# Patient Record
Sex: FEMALE | Race: WHITE | NOT HISPANIC OR LATINO | Employment: OTHER | URBAN - METROPOLITAN AREA
[De-identification: names, ages, dates, MRNs, and addresses within clinical notes are randomized per-mention and may not be internally consistent; named-entity substitution may affect disease eponyms.]

---

## 2023-03-07 LAB
ALBUMIN (G/DL) IN SER/PLAS: 4 G/DL (ref 3.4–5)
ANION GAP IN SER/PLAS: 12 MMOL/L (ref 10–20)
CALCIUM (MG/DL) IN SER/PLAS: 9.8 MG/DL (ref 8.6–10.3)
CARBON DIOXIDE, TOTAL (MMOL/L) IN SER/PLAS: 33 MMOL/L (ref 21–32)
CHLORIDE (MMOL/L) IN SER/PLAS: 102 MMOL/L (ref 98–107)
CREATININE (MG/DL) IN SER/PLAS: 1.98 MG/DL (ref 0.5–1.05)
ERYTHROCYTE DISTRIBUTION WIDTH (RATIO) BY AUTOMATED COUNT: 14.8 % (ref 11.5–14.5)
ERYTHROCYTE MEAN CORPUSCULAR HEMOGLOBIN CONCENTRATION (G/DL) BY AUTOMATED: 32.9 G/DL (ref 32–36)
ERYTHROCYTE MEAN CORPUSCULAR VOLUME (FL) BY AUTOMATED COUNT: 102 FL (ref 80–100)
ERYTHROCYTES (10*6/UL) IN BLOOD BY AUTOMATED COUNT: 3.83 X10E12/L (ref 4–5.2)
GFR FEMALE: 24 ML/MIN/1.73M2
GLUCOSE (MG/DL) IN SER/PLAS: 122 MG/DL (ref 74–99)
HEMATOCRIT (%) IN BLOOD BY AUTOMATED COUNT: 38.9 % (ref 36–46)
HEMOGLOBIN (G/DL) IN BLOOD: 12.8 G/DL (ref 12–16)
LEUKOCYTES (10*3/UL) IN BLOOD BY AUTOMATED COUNT: 7.2 X10E9/L (ref 4.4–11.3)
PHOSPHATE (MG/DL) IN SER/PLAS: 3 MG/DL (ref 2.5–4.9)
PLATELETS (10*3/UL) IN BLOOD AUTOMATED COUNT: 356 X10E9/L (ref 150–450)
POTASSIUM (MMOL/L) IN SER/PLAS: 4.1 MMOL/L (ref 3.5–5.3)
SODIUM (MMOL/L) IN SER/PLAS: 143 MMOL/L (ref 136–145)
UREA NITROGEN (MG/DL) IN SER/PLAS: 29 MG/DL (ref 6–23)

## 2023-03-08 LAB — PARATHYRIN INTACT (PG/ML) IN SER/PLAS: 73.2 PG/ML (ref 18.5–88)

## 2023-03-14 PROBLEM — I48.0 PAROXYSMAL ATRIAL FIBRILLATION (MULTI): Status: ACTIVE | Noted: 2023-03-14

## 2023-03-14 PROBLEM — I50.9 CHF (CONGESTIVE HEART FAILURE) (MULTI): Status: ACTIVE | Noted: 2023-03-14

## 2023-03-14 PROBLEM — E46 PROTEIN-CALORIE MALNUTRITION (MULTI): Status: ACTIVE | Noted: 2023-03-14

## 2023-03-14 PROBLEM — N18.4 STAGE 4 CHRONIC KIDNEY DISEASE (MULTI): Status: ACTIVE | Noted: 2023-03-14

## 2023-03-14 PROBLEM — N18.9 CKD (CHRONIC KIDNEY DISEASE): Status: ACTIVE | Noted: 2023-03-14

## 2023-03-14 PROBLEM — M10.9 GOUT OF LEFT ANKLE: Status: ACTIVE | Noted: 2023-03-14

## 2023-05-30 ENCOUNTER — TELEPHONE (OUTPATIENT)
Dept: PRIMARY CARE | Facility: CLINIC | Age: 88
End: 2023-05-30
Payer: MEDICARE

## 2023-05-30 RX ORDER — ALLOPURINOL 100 MG/1
100 TABLET ORAL DAILY
COMMUNITY
End: 2023-06-26 | Stop reason: SDUPTHER

## 2023-05-30 RX ORDER — AMIODARONE HYDROCHLORIDE 100 MG/1
100 TABLET ORAL DAILY
COMMUNITY
End: 2023-09-25 | Stop reason: SDUPTHER

## 2023-05-30 RX ORDER — AMIODARONE HYDROCHLORIDE 200 MG/1
TABLET ORAL DAILY
COMMUNITY
Start: 2023-04-05 | End: 2023-09-26 | Stop reason: SDUPTHER

## 2023-05-30 RX ORDER — ALBUTEROL SULFATE 90 UG/1
AEROSOL, METERED RESPIRATORY (INHALATION)
COMMUNITY
Start: 2018-02-14

## 2023-05-30 RX ORDER — NEBIVOLOL 10 MG/1
10 TABLET ORAL 2 TIMES DAILY
COMMUNITY

## 2023-05-30 RX ORDER — FLUTICASONE PROPIONATE 50 MCG
1 SPRAY, SUSPENSION (ML) NASAL 2 TIMES DAILY
COMMUNITY
Start: 2019-04-08

## 2023-05-30 RX ORDER — PANTOPRAZOLE SODIUM 40 MG/1
TABLET, DELAYED RELEASE ORAL
COMMUNITY
End: 2023-06-26 | Stop reason: SDUPTHER

## 2023-05-30 RX ORDER — ROSUVASTATIN CALCIUM 10 MG/1
10 TABLET, COATED ORAL DAILY
COMMUNITY

## 2023-05-30 RX ORDER — FUROSEMIDE 40 MG/1
40 TABLET ORAL 2 TIMES DAILY
COMMUNITY

## 2023-05-30 RX ORDER — VIT C/E/ZN/COPPR/LUTEIN/ZEAXAN 250MG-90MG
CAPSULE ORAL
COMMUNITY

## 2023-05-30 RX ORDER — CLONIDINE HYDROCHLORIDE 0.1 MG/1
0.1 TABLET ORAL 3 TIMES DAILY
COMMUNITY

## 2023-05-30 RX ORDER — ISOSORBIDE MONONITRATE 60 MG/1
60 TABLET, EXTENDED RELEASE ORAL DAILY
COMMUNITY

## 2023-05-30 RX ORDER — CARBOXYMETHYLCELLULOSE SODIUM 10 MG/ML
GEL OPHTHALMIC
COMMUNITY

## 2023-05-30 RX ORDER — CALCITRIOL 0.25 UG/1
CAPSULE ORAL
COMMUNITY

## 2023-05-30 RX ORDER — ZOLPIDEM TARTRATE 5 MG/1
TABLET ORAL
COMMUNITY
Start: 2021-10-11 | End: 2024-02-28 | Stop reason: ALTCHOICE

## 2023-05-30 RX ORDER — EZETIMIBE 10 MG/1
10 TABLET ORAL DAILY
COMMUNITY

## 2023-05-30 RX ORDER — POTASSIUM CHLORIDE 750 MG/1
20 TABLET, EXTENDED RELEASE ORAL DAILY
COMMUNITY

## 2023-05-30 RX ORDER — LEVOTHYROXINE SODIUM 75 UG/1
75 TABLET ORAL DAILY
COMMUNITY
End: 2023-06-20 | Stop reason: SDUPTHER

## 2023-05-30 RX ORDER — APIXABAN 2.5 MG/1
2.5 TABLET, FILM COATED ORAL 2 TIMES DAILY
COMMUNITY

## 2023-05-30 RX ORDER — TERBINAFINE HYDROCHLORIDE 250 MG/1
250 TABLET ORAL DAILY
COMMUNITY

## 2023-05-30 RX ORDER — ACETAMINOPHEN 500 MG
TABLET ORAL
COMMUNITY

## 2023-05-30 RX ORDER — POTASSIUM CHLORIDE 20 MEQ/1
TABLET, EXTENDED RELEASE ORAL
COMMUNITY
Start: 2023-03-31

## 2023-05-30 RX ORDER — CETIRIZINE HYDROCHLORIDE 10 MG/1
TABLET ORAL
COMMUNITY

## 2023-05-30 RX ORDER — POLYETHYLENE GLYCOL 3350 17 G/17G
1 POWDER, FOR SOLUTION ORAL
COMMUNITY

## 2023-06-20 DIAGNOSIS — E03.9 HYPOTHYROIDISM, UNSPECIFIED TYPE: ICD-10-CM

## 2023-06-20 RX ORDER — LEVOTHYROXINE SODIUM 75 UG/1
75 TABLET ORAL DAILY
Qty: 90 TABLET | Refills: 1 | Status: SHIPPED | OUTPATIENT
Start: 2023-06-20 | End: 2024-01-08 | Stop reason: SDUPTHER

## 2023-06-26 DIAGNOSIS — M10.9 GOUT OF LEFT ANKLE, UNSPECIFIED CAUSE, UNSPECIFIED CHRONICITY: ICD-10-CM

## 2023-06-26 DIAGNOSIS — K21.00 GASTROESOPHAGEAL REFLUX DISEASE WITH ESOPHAGITIS WITHOUT HEMORRHAGE: ICD-10-CM

## 2023-07-03 RX ORDER — PANTOPRAZOLE SODIUM 40 MG/1
TABLET, DELAYED RELEASE ORAL
Qty: 90 TABLET | Refills: 1 | Status: SHIPPED | OUTPATIENT
Start: 2023-07-03 | End: 2023-09-28

## 2023-07-03 RX ORDER — ALLOPURINOL 100 MG/1
100 TABLET ORAL DAILY
Qty: 90 TABLET | Refills: 1 | Status: SHIPPED | OUTPATIENT
Start: 2023-07-03 | End: 2024-01-30 | Stop reason: SDUPTHER

## 2023-09-06 ENCOUNTER — HOSPITAL ENCOUNTER (OUTPATIENT)
Dept: DATA CONVERSION | Facility: HOSPITAL | Age: 88
Discharge: HOME | End: 2023-09-06
Payer: MEDICARE

## 2023-09-06 DIAGNOSIS — N18.4 CHRONIC KIDNEY DISEASE, STAGE 4 (SEVERE) (MULTI): ICD-10-CM

## 2023-09-06 LAB
ALBUMIN SERPL-MCNC: 4 GM/DL (ref 3.5–5)
ANION GAP SERPL CALCULATED.3IONS-SCNC: 14 MMOL/L (ref 0–19)
BUN SERPL-MCNC: 31 MG/DL (ref 8–25)
BUN/CREAT SERPL: 16.3 RATIO (ref 8–21)
CALCIUM SERPL-MCNC: 9.7 MG/DL (ref 8.5–10.4)
CHLORIDE SERPL-SCNC: 102 MMOL/L (ref 97–107)
CO2 SERPL-SCNC: 26 MMOL/L (ref 24–31)
CREAT SERPL-MCNC: 1.9 MG/DL (ref 0.4–1.6)
DEPRECATED RDW RBC AUTO: 52.5 FL (ref 37–54)
ERYTHROCYTE [DISTWIDTH] IN BLOOD BY AUTOMATED COUNT: 14.5 % (ref 11.7–15)
GFR SERPL CREATININE-BSD FRML MDRD: 25 ML/MIN/1.73 M2
GLUCOSE SERPL-MCNC: 119 MG/DL (ref 65–99)
HCT VFR BLD AUTO: 39.5 % (ref 36–44)
HGB BLD-MCNC: 13.3 GM/DL (ref 12–15)
MCH RBC QN AUTO: 33.5 PG (ref 26–34)
MCHC RBC AUTO-ENTMCNC: 33.7 % (ref 31–37)
MCV RBC AUTO: 99.5 FL (ref 80–100)
NRBC BLD-RTO: 0 /100 WBC
PHOSPHATE SERPL-MCNC: 3.4 MG/DL (ref 2.5–4.5)
PLATELET # BLD AUTO: 368 K/UL (ref 150–450)
PMV BLD AUTO: 10.9 CU (ref 7–12.6)
POTASSIUM SERPL-SCNC: 4 MMOL/L (ref 3.4–5.1)
PTH-INTACT SERPL-MCNC: 88 PG/ML (ref 15–65)
RBC # BLD AUTO: 3.97 M/UL (ref 4–4.9)
SODIUM SERPL-SCNC: 142 MMOL/L (ref 133–145)
WBC # BLD AUTO: 7.7 K/UL (ref 4.5–11)

## 2023-09-25 DIAGNOSIS — I48.0 PAROXYSMAL ATRIAL FIBRILLATION (MULTI): ICD-10-CM

## 2023-09-26 RX ORDER — AMIODARONE HYDROCHLORIDE 100 MG/1
100 TABLET ORAL DAILY
Qty: 90 TABLET | Refills: 3 | Status: SHIPPED | OUTPATIENT
Start: 2023-09-26

## 2023-09-28 DIAGNOSIS — K21.00 GASTROESOPHAGEAL REFLUX DISEASE WITH ESOPHAGITIS WITHOUT HEMORRHAGE: ICD-10-CM

## 2023-09-28 RX ORDER — PANTOPRAZOLE SODIUM 40 MG/1
TABLET, DELAYED RELEASE ORAL
Qty: 90 TABLET | Refills: 1 | Status: SHIPPED | OUTPATIENT
Start: 2023-09-28 | End: 2023-10-03

## 2023-10-03 DIAGNOSIS — K21.00 GASTROESOPHAGEAL REFLUX DISEASE WITH ESOPHAGITIS WITHOUT HEMORRHAGE: ICD-10-CM

## 2023-10-03 RX ORDER — PANTOPRAZOLE SODIUM 40 MG/1
TABLET, DELAYED RELEASE ORAL
Qty: 90 TABLET | Refills: 1 | Status: SHIPPED | OUTPATIENT
Start: 2023-10-03 | End: 2024-01-04

## 2023-12-04 ENCOUNTER — APPOINTMENT (OUTPATIENT)
Dept: PULMONOLOGY | Facility: CLINIC | Age: 88
End: 2023-12-04
Payer: MEDICARE

## 2023-12-12 ENCOUNTER — APPOINTMENT (OUTPATIENT)
Dept: PULMONOLOGY | Facility: CLINIC | Age: 88
End: 2023-12-12
Payer: MEDICARE

## 2024-01-04 DIAGNOSIS — K21.00 GASTROESOPHAGEAL REFLUX DISEASE WITH ESOPHAGITIS WITHOUT HEMORRHAGE: ICD-10-CM

## 2024-01-04 RX ORDER — PANTOPRAZOLE SODIUM 40 MG/1
TABLET, DELAYED RELEASE ORAL
Qty: 90 TABLET | Refills: 3 | Status: SHIPPED | OUTPATIENT
Start: 2024-01-04 | End: 2024-05-28

## 2024-01-06 DIAGNOSIS — E03.9 HYPOTHYROIDISM, UNSPECIFIED TYPE: ICD-10-CM

## 2024-01-08 DIAGNOSIS — E03.9 HYPOTHYROIDISM, UNSPECIFIED TYPE: ICD-10-CM

## 2024-01-08 RX ORDER — LEVOTHYROXINE SODIUM 75 UG/1
TABLET ORAL
Qty: 90 TABLET | Refills: 3 | Status: SHIPPED | OUTPATIENT
Start: 2024-01-08

## 2024-01-08 RX ORDER — LEVOTHYROXINE SODIUM 75 UG/1
75 TABLET ORAL DAILY
Qty: 90 TABLET | Refills: 3 | Status: SHIPPED | OUTPATIENT
Start: 2024-01-08 | End: 2024-01-08 | Stop reason: SDUPTHER

## 2024-01-30 DIAGNOSIS — M10.9 GOUT OF LEFT ANKLE, UNSPECIFIED CAUSE, UNSPECIFIED CHRONICITY: ICD-10-CM

## 2024-01-31 RX ORDER — ALLOPURINOL 100 MG/1
100 TABLET ORAL DAILY
Qty: 90 TABLET | Refills: 3 | Status: SHIPPED | OUTPATIENT
Start: 2024-01-31

## 2024-02-02 ENCOUNTER — APPOINTMENT (OUTPATIENT)
Dept: PULMONOLOGY | Facility: CLINIC | Age: 89
End: 2024-02-02
Payer: MEDICARE

## 2024-02-28 ENCOUNTER — OFFICE VISIT (OUTPATIENT)
Dept: PRIMARY CARE | Facility: CLINIC | Age: 89
End: 2024-02-28
Payer: MEDICARE

## 2024-02-28 VITALS
WEIGHT: 162 LBS | TEMPERATURE: 97.2 F | SYSTOLIC BLOOD PRESSURE: 160 MMHG | BODY MASS INDEX: 29.81 KG/M2 | OXYGEN SATURATION: 97 % | DIASTOLIC BLOOD PRESSURE: 100 MMHG | HEIGHT: 62 IN | HEART RATE: 67 BPM

## 2024-02-28 DIAGNOSIS — N18.4 STAGE 4 CHRONIC KIDNEY DISEASE (MULTI): ICD-10-CM

## 2024-02-28 DIAGNOSIS — E03.9 ACQUIRED HYPOTHYROIDISM: ICD-10-CM

## 2024-02-28 DIAGNOSIS — G47.33 OSA ON CPAP: ICD-10-CM

## 2024-02-28 DIAGNOSIS — Z00.00 MEDICARE ANNUAL WELLNESS VISIT, SUBSEQUENT: Primary | ICD-10-CM

## 2024-02-28 DIAGNOSIS — E04.1 THYROID NODULE: ICD-10-CM

## 2024-02-28 PROCEDURE — 1126F AMNT PAIN NOTED NONE PRSNT: CPT | Performed by: FAMILY MEDICINE

## 2024-02-28 PROCEDURE — G0446 INTENS BEHAVE THER CARDIO DX: HCPCS | Performed by: FAMILY MEDICINE

## 2024-02-28 PROCEDURE — 1036F TOBACCO NON-USER: CPT | Performed by: FAMILY MEDICINE

## 2024-02-28 PROCEDURE — 99214 OFFICE O/P EST MOD 30 MIN: CPT | Performed by: FAMILY MEDICINE

## 2024-02-28 PROCEDURE — 1157F ADVNC CARE PLAN IN RCRD: CPT | Performed by: FAMILY MEDICINE

## 2024-02-28 PROCEDURE — 1170F FXNL STATUS ASSESSED: CPT | Performed by: FAMILY MEDICINE

## 2024-02-28 PROCEDURE — 1160F RVW MEDS BY RX/DR IN RCRD: CPT | Performed by: FAMILY MEDICINE

## 2024-02-28 PROCEDURE — 99397 PER PM REEVAL EST PAT 65+ YR: CPT | Performed by: FAMILY MEDICINE

## 2024-02-28 PROCEDURE — G0438 PPPS, INITIAL VISIT: HCPCS | Performed by: FAMILY MEDICINE

## 2024-02-28 PROCEDURE — 1159F MED LIST DOCD IN RCRD: CPT | Performed by: FAMILY MEDICINE

## 2024-02-28 ASSESSMENT — PATIENT HEALTH QUESTIONNAIRE - PHQ9
1. LITTLE INTEREST OR PLEASURE IN DOING THINGS: NOT AT ALL
SUM OF ALL RESPONSES TO PHQ9 QUESTIONS 1 AND 2: 0
2. FEELING DOWN, DEPRESSED OR HOPELESS: NOT AT ALL

## 2024-02-28 ASSESSMENT — ENCOUNTER SYMPTOMS
ADENOPATHY: 0
HALLUCINATIONS: 0
WEAKNESS: 0
EYE REDNESS: 0
FATIGUE: 0
SORE THROAT: 0
WOUND: 0
FEVER: 0
RHINORRHEA: 0
BACK PAIN: 0
DYSURIA: 0
CHILLS: 0
HEADACHES: 0
ABDOMINAL PAIN: 0
COUGH: 0
EYE PAIN: 0
SHORTNESS OF BREATH: 0
BLOOD IN STOOL: 0
BRUISES/BLEEDS EASILY: 0
POLYDIPSIA: 0
NECK STIFFNESS: 0
HEMATURIA: 0
PALPITATIONS: 0

## 2024-02-28 ASSESSMENT — ACTIVITIES OF DAILY LIVING (ADL)
DRESSING: INDEPENDENT
BATHING: INDEPENDENT
GROCERY_SHOPPING: NEEDS ASSISTANCE
MANAGING_FINANCES: INDEPENDENT
DOING_HOUSEWORK: INDEPENDENT
TAKING_MEDICATION: TOTAL CARE

## 2024-02-28 ASSESSMENT — PAIN SCALES - GENERAL: PAINLEVEL: 0-NO PAIN

## 2024-02-28 NOTE — ASSESSMENT & PLAN NOTE
# Gout -- last flare 12/2020 -- uric acid up to 8.8 down to 5.5 with allopurinol 100 mg daily. Chronic swelling around left ankle likely due to tophaceous infiltration and NOT acute flare.

## 2024-02-28 NOTE — ASSESSMENT & PLAN NOTE
# pAFIB -- paced (4/2017). On amiodarone to prevent recurrent episodes that cause blood clots. Off Aspirin 81 mg -- EGD clear of ulcers per Dr Donnelly but has GERD. On Eliquis due to PE 2/2020.

## 2024-02-28 NOTE — ASSESSMENT & PLAN NOTE
2/28/24 [facility] ANNUAL PREVENTATIVE EXAM -- check labs. encourage healthy diet and exercise. PLans to go to pool with dtr over the summer. HEP at home. 0/10 pain in right flank/low back.  Occasional left knee pain.     2/28/24 Medicare Wellness subsequenteval - no depression, no falls, no polypharmacy, no alcohol, no smoking. Declines flu and pneumonia vaccines. Colonoscopy 2016 --no further testing needed. Declines mammograms. Mild urinary incontinence. Mild vascular dementia -- lives with daughter in suite above the garage. Care team includes myself (PCP), Dr Jane (CARDIO), Dr Renteria (NEphro).     2/28/24  I spent 15 minutes face-to-face with this individual discussing their cardiovascular risk and behavioral therapies of nutritional choices, exercise, and elimination of habits contributing to risk. We agreed on a plan addressing reduction of their current cardiovascular risk. For patients with risk calculated >10%, aspirin use was discussed and encouraged unless known allergy or increased risk of bleeding contraindicates use. Patient's 10 year CV risk estimate calculates to: >10%      # Left neck pain due to Trapezius and SCM spasm -- stretching exercises provided, recommend topical ice pack for sharp pain, but mainly to use heat pack to help muscles relax.     # AK on b/l hands -- single large 5 mm AK treated with verucca freeze -- wound care instructions provided verbally to daughter Charito. 5/24/22.     # Insomnia -- stopped using ambien -- no longer working and doesn't need.     # Ataxia -- encourage use of walker instead of just cane. TOlerated cut in amiodarone to 100 mg daily.   # Hx COVID pna -- hospitalized 1/31-2/9/22. Back to baseline.  # hx Pna and hilar mass 6/2021 -- resolved. Path benign. Follow up ct scans with Dr Brennan.  # hx Fall with wrist fx 4/2021 -- healed -- out of wrist splint. follows with Dr Jarrett.   # Onychomycosis -- treat with terbinafine daily x 90 days.  # hx Abdominal  cramping and loss of appetite due to nausea -- improved off magnesium and calcium supplements.   # Constipation -- continue daily miralax. Avoid bananas.   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  # Diverticulitis -- resolved having completed course of bactrim/flagyl.  # Biliary dyskinesia -- resolved s/p jose 12/2019.  # hx pre-defecation syncopal episodes -- tolerating isosorbide ER cut from 60 bid to just 60 mg once at bedtime 12/2019.     # Hx of nasal polyps and allergic rhinitis --start fluticasone nasal spray (flonase)     # Seborrheic keratosis -- left neck -- reassurance given.      # Actinic keratoses -- left hand -- reassurance given. Will follow questionable lesion on right hand. Can excise if changes.      # Right shoulder OA --s/p successful TSA 11/2018. moderate cardiac risk for moderate risk orthopedic surgery.      # HTN -- letting ride high on isosorbide, bystolic and clonidine 0.1 tid (tolerating increase from hospitalization 5/2018). OFF ace due to angioedema. Off BB due to orthostatic hypotension 2/2017. F/U with Dr Jane.      # ANGIOEDEMA -- controlled on zyrtec.  # Right foot drop -- encouraged use of cane in her left hand. If having more trouble can get AFO or foot lift device.  # Hx of Left thalamic CVA -- symptoms returning to baseline. Mild dysgraphism persists. Due to pAFIB. Failed plavix due to gi bleed.  # GI bleed -- due to colonic avm and diverticulosis. Ok to eat nuts in moderation. EGD normal except GERD.     #Asthma -- stable on symbicort or advair.

## 2024-02-28 NOTE — PROGRESS NOTES
"Subjective   Reason for Visit: Renu Zimmerman is an 89 y.o. female here for a Medicare Wellness visit.  [facility] preventative exam and annual recheck on CHF, and PAFIB.    Noted thyroid nodule.    Recent bronchitis.  Green sputum.  Feeling better today.     Past Medical, Surgical, and Family History reviewed and updated in chart.             Patient Care Team:  Yonathan Arnold MD as PCP - General  Yonathan Arnold MD as PCP - [employer] PCP  Yonathan Arnold MD as Primary Care Provider     Review of Systems   Constitutional:  Negative for chills, fatigue and fever.   HENT:  Negative for rhinorrhea and sore throat.    Eyes:  Negative for pain and redness.   Respiratory:  Negative for cough and shortness of breath.    Cardiovascular:  Negative for chest pain and palpitations.   Gastrointestinal:  Negative for abdominal pain and blood in stool.   Endocrine: Negative for polydipsia and polyuria.   Genitourinary:  Negative for dysuria and hematuria.   Musculoskeletal:  Negative for back pain and neck stiffness.   Skin:  Negative for rash and wound.   Allergic/Immunologic: Negative for environmental allergies and food allergies.   Neurological:  Negative for weakness and headaches.   Hematological:  Negative for adenopathy. Does not bruise/bleed easily.   Psychiatric/Behavioral:  Negative for hallucinations and suicidal ideas.        Objective   Vitals:97% on room air  BP (!) 160/100   Pulse 67   Temp 36.2 °C (97.2 °F)   Ht 1.575 m (5' 2\")   Wt 73.5 kg (162 lb)   SpO2 97%   BMI 29.63 kg/m²       Physical Exam  Vitals reviewed.   Constitutional:       General: She is not in acute distress.     Appearance: She is not ill-appearing.   HENT:      Head: Normocephalic and atraumatic.      Right Ear: Tympanic membrane normal.      Left Ear: Tympanic membrane normal.      Nose: No congestion or rhinorrhea.      Mouth/Throat:      Pharynx: No oropharyngeal exudate or posterior oropharyngeal erythema. "   Eyes:      Extraocular Movements: Extraocular movements intact.      Conjunctiva/sclera: Conjunctivae normal.      Pupils: Pupils are equal, round, and reactive to light.   Neck:      Comments: Right thyroid fullness/nodule.  Cardiovascular:      Rate and Rhythm: Normal rate and regular rhythm.      Heart sounds: No murmur heard.     No friction rub. No gallop.      Comments: PPM  Pulmonary:      Effort: Pulmonary effort is normal.      Breath sounds: Normal breath sounds. No wheezing, rhonchi or rales.   Abdominal:      General: There is no distension.      Palpations: Abdomen is soft.      Tenderness: There is no abdominal tenderness. There is no guarding or rebound.   Musculoskeletal:         General: No swelling or deformity.      Cervical back: Normal range of motion and neck supple.      Right lower leg: No edema.      Left lower leg: No edema.   Skin:     Capillary Refill: Capillary refill takes less than 2 seconds.      Coloration: Skin is not jaundiced.      Findings: No rash.   Neurological:      General: No focal deficit present.      Mental Status: She is alert.      Motor: No weakness.   Psychiatric:         Mood and Affect: Mood normal.         Behavior: Behavior normal.         Assessment/Plan   Problem List Items Addressed This Visit       Stage 4 chronic kidney disease (Multi)    Overview     9/12/23 NEPHRO ([place]) stable CKD4.  Secondary Hyperparathyroid controlled.           Relevant Orders    Comprehensive metabolic panel (Completed)    CBC (Completed)    Lipid panel (Completed)    Hypothyroidism    Current Assessment & Plan     euthyroid since increased to thyroxine 75 mcg 3/28/17.  Recheck levels         Relevant Orders    Tsh With Reflex To Free T4 If Abnormal (Completed)    Medicare annual wellness visit, subsequent - Primary    Overview     2/28/24 [facility] and Annual [facility] preventive exam performed         Current Assessment & Plan     2/28/24 [facility] ANNUAL PREVENTATIVE EXAM -- check labs.  encourage healthy diet and exercise. PLans to go to pool with dtr over the summer. HEP at home. 0/10 pain in right flank/low back.  Occasional left knee pain.     2/28/24 Medicare Wellness subsequenteval - no depression, no falls, no polypharmacy, no alcohol, no smoking. Declines flu and pneumonia vaccines. Colonoscopy 2016 --no further testing needed. Declines mammograms. Mild urinary incontinence. Mild vascular dementia -- lives with daughter in suite above the garage. Care team includes myself (PCP), Dr Jane (CARDIO), Dr Renteria (NEphro).     2/28/24  I spent 15 minutes face-to-face with this individual discussing their cardiovascular risk and behavioral therapies of nutritional choices, exercise, and elimination of habits contributing to risk. We agreed on a plan addressing reduction of their current cardiovascular risk. For patients with risk calculated >10%, aspirin use was discussed and encouraged unless known allergy or increased risk of bleeding contraindicates use. Patient's 10 year CV risk estimate calculates to: >10%      # Left neck pain due to Trapezius and SCM spasm -- stretching exercises provided, recommend topical ice pack for sharp pain, but mainly to use heat pack to help muscles relax.     # AK on b/l hands -- single large 5 mm AK treated with verucca freeze -- wound care instructions provided verbally to daughter Charito. 5/24/22.     # Insomnia -- stopped using ambien -- no longer working and doesn't need.     # Ataxia -- encourage use of walker instead of just cane. TOlerated cut in amiodarone to 100 mg daily.   # Hx COVID pna -- hospitalized 1/31-2/9/22. Back to baseline.  # hx Pna and hilar mass 6/2021 -- resolved. Path benign. Follow up ct scans with Dr Brennan.  # hx Fall with wrist fx 4/2021 -- healed -- out of wrist splint. follows with Dr Jarrett.   # Onychomycosis -- treat with terbinafine daily x 90 days.  # hx Abdominal cramping and loss of appetite due to nausea -- improved  off magnesium and calcium supplements.   # Constipation -- continue daily miralax. Avoid bananas.   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  # Diverticulitis -- resolved having completed course of bactrim/flagyl.  # Biliary dyskinesia -- resolved s/p jose 12/2019.  # hx pre-defecation syncopal episodes -- tolerating isosorbide ER cut from 60 bid to just 60 mg once at bedtime 12/2019.     # Hx of nasal polyps and allergic rhinitis --start fluticasone nasal spray (flonase)     # Seborrheic keratosis -- left neck -- reassurance given.      # Actinic keratoses -- left hand -- reassurance given. Will follow questionable lesion on right hand. Can excise if changes.      # Right shoulder OA --s/p successful TSA 11/2018. moderate cardiac risk for moderate risk orthopedic surgery.      # HTN -- letting ride high on isosorbide, bystolic and clonidine 0.1 tid (tolerating increase from hospitalization 5/2018). OFF ace due to angioedema. Off BB due to orthostatic hypotension 2/2017. F/U with Dr Jane.      # ANGIOEDEMA -- controlled on zyrtec.  # Right foot drop -- encouraged use of cane in her left hand. If having more trouble can get AFO or foot lift device.  # Hx of Left thalamic CVA -- symptoms returning to baseline. Mild dysgraphism persists. Due to pAFIB. Failed plavix due to gi bleed.  # GI bleed -- due to colonic avm and diverticulosis. Ok to eat nuts in moderation. EGD normal except GERD.     #Asthma -- stable on symbicort or advair.             BHARATI on CPAP    Overview     Long time us of CPAP to good effect.          Current Assessment & Plan     Compliant use of CPAP nightly for 8+ hours.   Continue current settings.   No complaint of air leaking.   No daytime somnolence.           Other Visit Diagnoses       Thyroid nodule        Relevant Orders    US thyroid (Completed)

## 2024-02-28 NOTE — ASSESSMENT & PLAN NOTE
# HFpEF -- dCHF -- stable on current 40 mg lasix twice daily. Dry weight previously 159.. Weight up to 171 at home with improved nutrition and eating lunch. Euvolemic on exam 2/22/23. F/U Dr Jane.

## 2024-03-08 ENCOUNTER — LAB (OUTPATIENT)
Dept: LAB | Facility: LAB | Age: 89
End: 2024-03-08
Payer: MEDICARE

## 2024-03-08 DIAGNOSIS — E03.9 ACQUIRED HYPOTHYROIDISM: ICD-10-CM

## 2024-03-08 DIAGNOSIS — N18.4 STAGE 4 CHRONIC KIDNEY DISEASE (MULTI): ICD-10-CM

## 2024-03-08 LAB
ALBUMIN SERPL BCP-MCNC: 3.6 G/DL (ref 3.4–5)
ALP SERPL-CCNC: 103 U/L (ref 33–136)
ALT SERPL W P-5'-P-CCNC: 14 U/L (ref 7–45)
ANION GAP SERPL CALC-SCNC: 13 MMOL/L (ref 10–20)
AST SERPL W P-5'-P-CCNC: 18 U/L (ref 9–39)
BILIRUB SERPL-MCNC: 0.7 MG/DL (ref 0–1.2)
BUN SERPL-MCNC: 26 MG/DL (ref 6–23)
CALCIUM SERPL-MCNC: 9.2 MG/DL (ref 8.6–10.3)
CHLORIDE SERPL-SCNC: 102 MMOL/L (ref 98–107)
CHOLEST SERPL-MCNC: 147 MG/DL (ref 0–199)
CHOLESTEROL/HDL RATIO: 3.5
CO2 SERPL-SCNC: 31 MMOL/L (ref 21–32)
CREAT SERPL-MCNC: 1.66 MG/DL (ref 0.5–1.05)
EGFRCR SERPLBLD CKD-EPI 2021: 29 ML/MIN/1.73M*2
ERYTHROCYTE [DISTWIDTH] IN BLOOD BY AUTOMATED COUNT: 14.5 % (ref 11.5–14.5)
GLUCOSE SERPL-MCNC: 104 MG/DL (ref 74–99)
HCT VFR BLD AUTO: 41.2 % (ref 36–46)
HDLC SERPL-MCNC: 41.9 MG/DL
HGB BLD-MCNC: 13.3 G/DL (ref 12–16)
LDLC SERPL CALC-MCNC: 71 MG/DL
MCH RBC QN AUTO: 32.8 PG (ref 26–34)
MCHC RBC AUTO-ENTMCNC: 32.3 G/DL (ref 32–36)
MCV RBC AUTO: 102 FL (ref 80–100)
NON HDL CHOLESTEROL: 105 MG/DL (ref 0–149)
NRBC BLD-RTO: 0 /100 WBCS (ref 0–0)
PLATELET # BLD AUTO: 466 X10*3/UL (ref 150–450)
POTASSIUM SERPL-SCNC: 4.1 MMOL/L (ref 3.5–5.3)
PROT SERPL-MCNC: 6 G/DL (ref 6.4–8.2)
RBC # BLD AUTO: 4.06 X10*6/UL (ref 4–5.2)
SODIUM SERPL-SCNC: 142 MMOL/L (ref 136–145)
TRIGL SERPL-MCNC: 169 MG/DL (ref 0–149)
TSH SERPL-ACNC: 1.23 MIU/L (ref 0.44–3.98)
VLDL: 34 MG/DL (ref 0–40)
WBC # BLD AUTO: 7.8 X10*3/UL (ref 4.4–11.3)

## 2024-03-08 PROCEDURE — 80061 LIPID PANEL: CPT

## 2024-03-08 PROCEDURE — 36415 COLL VENOUS BLD VENIPUNCTURE: CPT

## 2024-03-08 PROCEDURE — 85027 COMPLETE CBC AUTOMATED: CPT

## 2024-03-08 PROCEDURE — 84443 ASSAY THYROID STIM HORMONE: CPT

## 2024-03-08 PROCEDURE — 80053 COMPREHEN METABOLIC PANEL: CPT

## 2024-03-14 ENCOUNTER — HOSPITAL ENCOUNTER (OUTPATIENT)
Dept: RADIOLOGY | Facility: HOSPITAL | Age: 89
Discharge: HOME | End: 2024-03-14
Payer: MEDICARE

## 2024-03-14 DIAGNOSIS — E04.1 THYROID NODULE: ICD-10-CM

## 2024-03-14 PROCEDURE — 76536 US EXAM OF HEAD AND NECK: CPT | Performed by: STUDENT IN AN ORGANIZED HEALTH CARE EDUCATION/TRAINING PROGRAM

## 2024-03-14 PROCEDURE — 76536 US EXAM OF HEAD AND NECK: CPT

## 2024-03-29 ENCOUNTER — OFFICE VISIT (OUTPATIENT)
Dept: PULMONOLOGY | Facility: CLINIC | Age: 89
End: 2024-03-29
Payer: MEDICARE

## 2024-03-29 VITALS
BODY MASS INDEX: 31.09 KG/M2 | WEIGHT: 170 LBS | RESPIRATION RATE: 16 BRPM | OXYGEN SATURATION: 95 % | HEART RATE: 60 BPM | DIASTOLIC BLOOD PRESSURE: 84 MMHG | SYSTOLIC BLOOD PRESSURE: 189 MMHG

## 2024-03-29 DIAGNOSIS — U09.9 LONG COVID: Primary | ICD-10-CM

## 2024-03-29 DIAGNOSIS — R91.8 GROUND GLASS OPACITY PRESENT ON IMAGING OF LUNG: ICD-10-CM

## 2024-03-29 PROCEDURE — 1160F RVW MEDS BY RX/DR IN RCRD: CPT | Performed by: INTERNAL MEDICINE

## 2024-03-29 PROCEDURE — 99212 OFFICE O/P EST SF 10 MIN: CPT | Performed by: INTERNAL MEDICINE

## 2024-03-29 PROCEDURE — 1157F ADVNC CARE PLAN IN RCRD: CPT | Performed by: INTERNAL MEDICINE

## 2024-03-29 PROCEDURE — 1036F TOBACCO NON-USER: CPT | Performed by: INTERNAL MEDICINE

## 2024-03-29 PROCEDURE — 1126F AMNT PAIN NOTED NONE PRSNT: CPT | Performed by: INTERNAL MEDICINE

## 2024-03-29 PROCEDURE — 1159F MED LIST DOCD IN RCRD: CPT | Performed by: INTERNAL MEDICINE

## 2024-03-29 ASSESSMENT — ENCOUNTER SYMPTOMS
COUGH: 0
FEVER: 0
CHILLS: 0
RESPIRATORY NEGATIVE: 1
CARDIOVASCULAR NEGATIVE: 1
GASTROINTESTINAL NEGATIVE: 1
PSYCHIATRIC NEGATIVE: 1
DEPRESSION: 0
NEUROLOGICAL NEGATIVE: 1

## 2024-03-29 ASSESSMENT — PAIN SCALES - GENERAL: PAINLEVEL: 0-NO PAIN

## 2024-03-29 ASSESSMENT — COLUMBIA-SUICIDE SEVERITY RATING SCALE - C-SSRS
1. IN THE PAST MONTH, HAVE YOU WISHED YOU WERE DEAD OR WISHED YOU COULD GO TO SLEEP AND NOT WAKE UP?: NO
2. HAVE YOU ACTUALLY HAD ANY THOUGHTS OF KILLING YOURSELF?: NO
6. HAVE YOU EVER DONE ANYTHING, STARTED TO DO ANYTHING, OR PREPARED TO DO ANYTHING TO END YOUR LIFE?: NO

## 2024-03-29 ASSESSMENT — PATIENT HEALTH QUESTIONNAIRE - PHQ9
SUM OF ALL RESPONSES TO PHQ9 QUESTIONS 1 AND 2: 0
1. LITTLE INTEREST OR PLEASURE IN DOING THINGS: NOT AT ALL
2. FEELING DOWN, DEPRESSED OR HOPELESS: NOT AT ALL

## 2024-03-29 NOTE — PROGRESS NOTES
Subjective   Patient ID: Renu Zimmerman is a 89 y.o. female who presents for Lung Eval.  h/o VTE on eliquis, afib admitted in 1/2022 for COVID here for f/u. also h/o lung nodules. had nondiagnostic EBUS while admitted. Extensive GGOS during COVID episode. Repeat CT chest w/ resolution of most GGOs. one stable area of GGO. PReviously seen nodule slightly decreased. No issues with breathing.  No fevers, chills or cough.  ET is about 50 feet.         Review of Systems   Constitutional:  Negative for chills and fever.   Respiratory: Negative.  Negative for cough.    Cardiovascular: Negative.    Gastrointestinal: Negative.    Neurological: Negative.    Psychiatric/Behavioral: Negative.     All other systems reviewed and are negative.      Objective   Physical Exam  Vitals reviewed.   Constitutional:       Appearance: Normal appearance.   HENT:      Head: Normocephalic and atraumatic.   Eyes:      Extraocular Movements: Extraocular movements intact.   Cardiovascular:      Rate and Rhythm: Normal rate and regular rhythm.      Heart sounds: Normal heart sounds.   Pulmonary:      Effort: Pulmonary effort is normal.      Breath sounds: Normal breath sounds.   Abdominal:      Palpations: Abdomen is soft.      Tenderness: There is no abdominal tenderness.   Musculoskeletal:      Cervical back: Normal range of motion.   Skin:     General: Skin is warm.   Neurological:      General: No focal deficit present.      Mental Status: She is alert and oriented to person, place, and time. Mental status is at baseline.   Psychiatric:         Mood and Affect: Mood normal.         Behavior: Behavior normal.         Assessment/Plan   Problem List Items Addressed This Visit       RESOLVED: Long COVID - Primary     Resolved.         Ground glass opacity present on imaging of lung     PNA and hilar adenopathy - nondiagnostic EBUS. Resolution of GGOs.  Previously stable GGO and nodule.  No further workup at this time.            RTC in as  needed    Time Spent  Prep time on day of patient encounter: 5 minutes  Time spent directly with patient, family or caregiver: 5 minutes  Additional Time Spent on Patient Care Activities: 0 minutes  Documentation Time: 5 minutes  Other Time Spent: 0 minutes  Total: 15 minutes        Jamshid Brennan MD 03/29/24 2:23 PM

## 2024-03-29 NOTE — ASSESSMENT & PLAN NOTE
PNA and hilar adenopathy - nondiagnostic EBUS. Resolution of GGOs.  Previously stable GGO and nodule.  No further workup at this time.

## 2024-05-13 ENCOUNTER — TELEPHONE (OUTPATIENT)
Dept: PRIMARY CARE | Facility: CLINIC | Age: 89
End: 2024-05-13
Payer: MEDICARE

## 2024-05-13 NOTE — TELEPHONE ENCOUNTER
Sally from [facility] requesting signed recent office notes that states pt is still using and benefiting from c-pap machine.    Phone: [phone]    Fax:.[phone]

## 2024-05-14 PROBLEM — G47.33 OSA ON CPAP: Status: ACTIVE | Noted: 2024-05-14

## 2024-05-14 NOTE — ASSESSMENT & PLAN NOTE
Compliant use of CPAP nightly for 8+ hours.   Continue current settings.   No complaint of air leaking.   No daytime somnolence.

## 2024-05-21 PROBLEM — H35.30 AMD (AGE-RELATED MACULAR DEGENERATION), BILATERAL: Status: ACTIVE | Noted: 2024-05-21

## 2024-05-23 ENCOUNTER — TELEPHONE (OUTPATIENT)
Dept: PRIMARY CARE | Facility: CLINIC | Age: 89
End: 2024-05-23
Payer: MEDICARE

## 2024-05-23 NOTE — TELEPHONE ENCOUNTER
Pt's daughter Charito called requesting an appt for patient to get C-pap renewal. When should I put her in?

## 2024-05-25 DIAGNOSIS — K21.00 GASTROESOPHAGEAL REFLUX DISEASE WITH ESOPHAGITIS WITHOUT HEMORRHAGE: ICD-10-CM

## 2024-05-28 RX ORDER — PANTOPRAZOLE SODIUM 40 MG/1
TABLET, DELAYED RELEASE ORAL
Qty: 180 TABLET | Refills: 3 | Status: SHIPPED | OUTPATIENT
Start: 2024-05-28

## 2024-05-31 ENCOUNTER — TELEMEDICINE (OUTPATIENT)
Dept: PRIMARY CARE | Facility: CLINIC | Age: 89
End: 2024-05-31
Payer: MEDICARE

## 2024-05-31 DIAGNOSIS — G47.33 OSA ON CPAP: Primary | ICD-10-CM

## 2024-05-31 PROCEDURE — 99213 OFFICE O/P EST LOW 20 MIN: CPT | Performed by: FAMILY MEDICINE

## 2024-05-31 PROCEDURE — 1160F RVW MEDS BY RX/DR IN RCRD: CPT | Performed by: FAMILY MEDICINE

## 2024-05-31 PROCEDURE — 1157F ADVNC CARE PLAN IN RCRD: CPT | Performed by: FAMILY MEDICINE

## 2024-05-31 PROCEDURE — 1036F TOBACCO NON-USER: CPT | Performed by: FAMILY MEDICINE

## 2024-05-31 PROCEDURE — 1159F MED LIST DOCD IN RCRD: CPT | Performed by: FAMILY MEDICINE

## 2024-05-31 NOTE — PROGRESS NOTES
"Subjective   Patient ID: Renu Zimmerman is a 89 y.o. female who presents for recheck on BHARATI.     Had new CPAP machine delivered to replace 5-year old machine.   Has been using this for 10 days to good effect.      Long time use of CPAP prior to this.    Had some complaint of air leaking around mask with new CPAP machine, but this improved with new face-mask.   No daytime somnolence.     No cp/sob/n/v/d.   Objective   Visit Vitals  Smoking Status Never      O: Awake, alert, NAD. No intoxication, withdrawal or sedation noted per two-way audio-video feed.    Lab Results   Component Value Date    WBC 7.8 03/08/2024    HGB 13.3 03/08/2024    HCT 41.2 03/08/2024     (H) 03/08/2024     (H) 03/08/2024       Chemistry    Lab Results   Component Value Date/Time     03/08/2024 1000    K 4.1 03/08/2024 1000     03/08/2024 1000    CO2 31 03/08/2024 1000    BUN 26 (H) 03/08/2024 1000    CREATININE 1.66 (H) 03/08/2024 1000    Lab Results   Component Value Date/Time    CALCIUM 9.2 03/08/2024 1000    ALKPHOS 103 03/08/2024 1000    AST 18 03/08/2024 1000    ALT 14 03/08/2024 1000    BILITOT 0.7 03/08/2024 1000          Lab Results   Component Value Date    CHOL 147 03/08/2024    CHOL 178 03/29/2022    CHOL 178 10/12/2020     Lab Results   Component Value Date    HDL 41.9 03/08/2024    HDL 54.5 03/29/2022    HDL 49.8 10/12/2020     Lab Results   Component Value Date    LDLCALC 71 03/08/2024     Lab Results   Component Value Date    TRIG 169 (H) 03/08/2024    TRIG 175 (H) 04/16/2020    TRIG 70 02/18/2020     No components found for: \"CHOLHDL\"   Lab Results   Component Value Date    HGBA1C 6.5 12/29/2020       Assessment/Plan   Problem List Items Addressed This Visit       BHARATI on CPAP - Primary    Overview     Had new CPAP machine delivered to replace 5-year old machine.   Has been using this for 10 days to good effect.     Long time use of CPAP prior to this.         Current Assessment & Plan     Compliant " use of CPAP nightly for 8+ hours/night.   Continue current settings.   Had some complaint of air leaking around mask with new CPAP machine, but this improved with new face-mask.   No daytime somnolence.

## 2024-05-31 NOTE — ASSESSMENT & PLAN NOTE
Compliant use of CPAP nightly for 8+ hours/night.   Continue current settings.   Had some complaint of air leaking around mask with new CPAP machine, but this improved with new face-mask.   No daytime somnolence.

## 2024-05-31 NOTE — ASSESSMENT & PLAN NOTE
Euthyroid since increased to thyroxine 75 mcg 3/28/17.    3/2024 Thyroid US: 3 mildly suspicious nodules max size 10 mm.  <5% cancer risk. Reassurance provided but can refer to Dr Sandra if she wants a second opinion.

## 2024-06-11 ENCOUNTER — OFFICE VISIT (OUTPATIENT)
Dept: PRIMARY CARE | Facility: CLINIC | Age: 89
End: 2024-06-11
Payer: MEDICARE

## 2024-06-11 ENCOUNTER — HOSPITAL ENCOUNTER (OUTPATIENT)
Dept: RADIOLOGY | Facility: CLINIC | Age: 89
Discharge: HOME | End: 2024-06-11
Payer: MEDICARE

## 2024-06-11 VITALS
HEART RATE: 60 BPM | RESPIRATION RATE: 16 BRPM | BODY MASS INDEX: 30.36 KG/M2 | TEMPERATURE: 97.3 F | WEIGHT: 166 LBS | SYSTOLIC BLOOD PRESSURE: 150 MMHG | DIASTOLIC BLOOD PRESSURE: 70 MMHG | OXYGEN SATURATION: 95 %

## 2024-06-11 DIAGNOSIS — Q74.0 ABNORMAL PROMINENCE OF CLAVICLE: Primary | ICD-10-CM

## 2024-06-11 DIAGNOSIS — M10.9 GOUT OF LEFT ANKLE, UNSPECIFIED CAUSE, UNSPECIFIED CHRONICITY: ICD-10-CM

## 2024-06-11 DIAGNOSIS — Q74.0 ABNORMAL PROMINENCE OF CLAVICLE: ICD-10-CM

## 2024-06-11 PROBLEM — K21.00 REFLUX ESOPHAGITIS: Status: RESOLVED | Noted: 2023-06-26 | Resolved: 2024-06-11

## 2024-06-11 PROCEDURE — 1036F TOBACCO NON-USER: CPT | Performed by: FAMILY MEDICINE

## 2024-06-11 PROCEDURE — 99213 OFFICE O/P EST LOW 20 MIN: CPT | Performed by: FAMILY MEDICINE

## 2024-06-11 PROCEDURE — 71130 X-RAY STRENOCLAVIC JT 3/>VWS: CPT

## 2024-06-11 PROCEDURE — 1157F ADVNC CARE PLAN IN RCRD: CPT | Performed by: FAMILY MEDICINE

## 2024-06-11 PROCEDURE — 71130 X-RAY STRENOCLAVIC JT 3/>VWS: CPT | Performed by: RADIOLOGY

## 2024-06-11 NOTE — ASSESSMENT & PLAN NOTE
last flare 12/2020 -- uric acid down to 5.8 (2022) with allopurinol 100 mg daily.     No flare-ups since stopped eating beef.    Chronic swelling around left ankle likely due to tophaceous infiltration and NOT acute flare.     OK to try stopping allopurinol.  Discussed 6/11/24

## 2024-06-11 NOTE — PROGRESS NOTES
"Subjective   Patient ID: Renu Zimmerman is a 89 y.o. female who presents for spots on neck .  C/O non tender firm nodules above her chest bone.      No f/c/n//d/cp/sob/cough.       Objective   Visit Vitals  /70 (BP Location: Left arm, Patient Position: Sitting, BP Cuff Size: Adult)   Pulse 60   Temp 36.3 °C (97.3 °F) (Temporal)   Resp 16   Wt 75.3 kg (166 lb)   SpO2 95%   BMI 30.36 kg/m²   Smoking Status Never   BSA 1.82 m²      O: VSS AFEB Awake, Alert, NAD.  No intoxication, withdrawal, or sedation.  Chest CTA.  Heart RRR.  Ext no c/c/e.    Prominent but NON tender Sterno-clavicular joints b/l.  Right > Left.  Small thyroid.  Non tender neck.     Lab Results   Component Value Date    WBC 7.8 03/08/2024    HGB 13.3 03/08/2024    HCT 41.2 03/08/2024     (H) 03/08/2024     (H) 03/08/2024       Chemistry    Lab Results   Component Value Date/Time     03/08/2024 1000    K 4.1 03/08/2024 1000     03/08/2024 1000    CO2 31 03/08/2024 1000    BUN 26 (H) 03/08/2024 1000    CREATININE 1.66 (H) 03/08/2024 1000    Lab Results   Component Value Date/Time    CALCIUM 9.2 03/08/2024 1000    ALKPHOS 103 03/08/2024 1000    AST 18 03/08/2024 1000    ALT 14 03/08/2024 1000    BILITOT 0.7 03/08/2024 1000          Lab Results   Component Value Date    CHOL 147 03/08/2024    CHOL 178 03/29/2022    CHOL 178 10/12/2020     Lab Results   Component Value Date    HDL 41.9 03/08/2024    HDL 54.5 03/29/2022    HDL 49.8 10/12/2020     Lab Results   Component Value Date    LDLCALC 71 03/08/2024     Lab Results   Component Value Date    TRIG 169 (H) 03/08/2024    TRIG 175 (H) 04/16/2020    TRIG 70 02/18/2020     No components found for: \"CHOLHDL\"   Lab Results   Component Value Date    HGBA1C 6.5 12/29/2020       Assessment/Plan   Problem List Items Addressed This Visit       Gout of left ankle    Current Assessment & Plan     last flare 12/2020 -- uric acid down to 5.8 (2022) with allopurinol 100 mg daily. "     No flare-ups since stopped eating beef.    Chronic swelling around left ankle likely due to tophaceous infiltration and NOT acute flare.     OK to try stopping allopurinol.  Discussed 6/11/24         Abnormal prominence of clavicle - Primary    Current Assessment & Plan     check xray. reasurance provided.          Relevant Orders    XR sternum 2+ views

## 2024-07-02 ENCOUNTER — LAB (OUTPATIENT)
Dept: LAB | Facility: LAB | Age: 89
End: 2024-07-02
Payer: MEDICARE

## 2024-07-02 DIAGNOSIS — N18.4 CHRONIC KIDNEY DISEASE, STAGE 4 (SEVERE) (MULTI): Primary | ICD-10-CM

## 2024-07-02 LAB
ALBUMIN SERPL BCP-MCNC: 3.9 G/DL (ref 3.4–5)
ANION GAP SERPL CALC-SCNC: 11 MMOL/L (ref 10–20)
BUN SERPL-MCNC: 29 MG/DL (ref 6–23)
CALCIUM SERPL-MCNC: 10.1 MG/DL (ref 8.6–10.3)
CHLORIDE SERPL-SCNC: 102 MMOL/L (ref 98–107)
CO2 SERPL-SCNC: 32 MMOL/L (ref 21–32)
CREAT SERPL-MCNC: 1.55 MG/DL (ref 0.5–1.05)
EGFRCR SERPLBLD CKD-EPI 2021: 32 ML/MIN/1.73M*2
ERYTHROCYTE [DISTWIDTH] IN BLOOD BY AUTOMATED COUNT: 14.4 % (ref 11.5–14.5)
GLUCOSE SERPL-MCNC: 126 MG/DL (ref 74–99)
HCT VFR BLD AUTO: 41.6 % (ref 36–46)
HGB BLD-MCNC: 13.5 G/DL (ref 12–16)
MCH RBC QN AUTO: 32.4 PG (ref 26–34)
MCHC RBC AUTO-ENTMCNC: 32.5 G/DL (ref 32–36)
MCV RBC AUTO: 100 FL (ref 80–100)
NRBC BLD-RTO: 0 /100 WBCS (ref 0–0)
PHOSPHATE SERPL-MCNC: 3.1 MG/DL (ref 2.5–4.9)
PLATELET # BLD AUTO: 360 X10*3/UL (ref 150–450)
POTASSIUM SERPL-SCNC: 4.3 MMOL/L (ref 3.5–5.3)
PTH-INTACT SERPL-MCNC: 65.4 PG/ML (ref 18.5–88)
RBC # BLD AUTO: 4.17 X10*6/UL (ref 4–5.2)
SODIUM SERPL-SCNC: 141 MMOL/L (ref 136–145)
WBC # BLD AUTO: 7 X10*3/UL (ref 4.4–11.3)

## 2024-07-02 PROCEDURE — 80069 RENAL FUNCTION PANEL: CPT

## 2024-07-02 PROCEDURE — 83970 ASSAY OF PARATHORMONE: CPT

## 2024-07-02 PROCEDURE — 85027 COMPLETE CBC AUTOMATED: CPT

## 2024-07-02 PROCEDURE — 36415 COLL VENOUS BLD VENIPUNCTURE: CPT

## 2024-10-05 DIAGNOSIS — E03.9 HYPOTHYROIDISM, UNSPECIFIED TYPE: ICD-10-CM

## 2024-10-07 RX ORDER — LEVOTHYROXINE SODIUM 75 UG/1
TABLET ORAL
Qty: 90 TABLET | Refills: 3 | Status: SHIPPED | OUTPATIENT
Start: 2024-10-07

## 2024-11-25 ENCOUNTER — LAB (OUTPATIENT)
Dept: LAB | Facility: LAB | Age: 89
End: 2024-11-25
Payer: MEDICARE

## 2024-11-25 DIAGNOSIS — N18.4 CHRONIC KIDNEY DISEASE, STAGE 4 (SEVERE) (MULTI): Primary | ICD-10-CM

## 2024-11-25 LAB
ALBUMIN SERPL BCP-MCNC: 4.1 G/DL (ref 3.4–5)
ANION GAP SERPL CALC-SCNC: 15 MMOL/L (ref 10–20)
BUN SERPL-MCNC: 23 MG/DL (ref 6–23)
CALCIUM SERPL-MCNC: 10 MG/DL (ref 8.6–10.3)
CHLORIDE SERPL-SCNC: 101 MMOL/L (ref 98–107)
CO2 SERPL-SCNC: 30 MMOL/L (ref 21–32)
CREAT SERPL-MCNC: 1.68 MG/DL (ref 0.5–1.05)
EGFRCR SERPLBLD CKD-EPI 2021: 29 ML/MIN/1.73M*2
ERYTHROCYTE [DISTWIDTH] IN BLOOD BY AUTOMATED COUNT: 13.9 % (ref 11.5–14.5)
GLUCOSE SERPL-MCNC: 159 MG/DL (ref 74–99)
HCT VFR BLD AUTO: 42.5 % (ref 36–46)
HGB BLD-MCNC: 14 G/DL (ref 12–16)
MCH RBC QN AUTO: 32.5 PG (ref 26–34)
MCHC RBC AUTO-ENTMCNC: 32.9 G/DL (ref 32–36)
MCV RBC AUTO: 99 FL (ref 80–100)
NRBC BLD-RTO: 0 /100 WBCS (ref 0–0)
PHOSPHATE SERPL-MCNC: 2.9 MG/DL (ref 2.5–4.9)
PLATELET # BLD AUTO: 353 X10*3/UL (ref 150–450)
POTASSIUM SERPL-SCNC: 3.9 MMOL/L (ref 3.5–5.3)
PTH-INTACT SERPL-MCNC: 40 PG/ML (ref 18.5–88)
RBC # BLD AUTO: 4.31 X10*6/UL (ref 4–5.2)
SODIUM SERPL-SCNC: 142 MMOL/L (ref 136–145)
WBC # BLD AUTO: 6.9 X10*3/UL (ref 4.4–11.3)

## 2024-11-25 PROCEDURE — 85027 COMPLETE CBC AUTOMATED: CPT

## 2024-11-25 PROCEDURE — 83970 ASSAY OF PARATHORMONE: CPT

## 2024-11-25 PROCEDURE — 36415 COLL VENOUS BLD VENIPUNCTURE: CPT

## 2024-11-25 PROCEDURE — 80069 RENAL FUNCTION PANEL: CPT

## 2025-01-26 DIAGNOSIS — M10.9 GOUT OF LEFT ANKLE, UNSPECIFIED CAUSE, UNSPECIFIED CHRONICITY: ICD-10-CM

## 2025-01-27 RX ORDER — ALLOPURINOL 100 MG/1
100 TABLET ORAL DAILY
Qty: 90 TABLET | Refills: 3 | Status: SHIPPED | OUTPATIENT
Start: 2025-01-27

## 2025-03-05 ENCOUNTER — APPOINTMENT (OUTPATIENT)
Dept: PRIMARY CARE | Facility: CLINIC | Age: OVER 89
End: 2025-03-05
Payer: MEDICARE

## 2025-03-05 VITALS
BODY MASS INDEX: 28.9 KG/M2 | SYSTOLIC BLOOD PRESSURE: 140 MMHG | HEART RATE: 61 BPM | DIASTOLIC BLOOD PRESSURE: 82 MMHG | WEIGHT: 158 LBS | TEMPERATURE: 98.1 F | OXYGEN SATURATION: 94 %

## 2025-03-05 DIAGNOSIS — H61.21 EXCESSIVE EAR WAX, RIGHT: ICD-10-CM

## 2025-03-05 DIAGNOSIS — Z23 NEED FOR VACCINATION: ICD-10-CM

## 2025-03-05 DIAGNOSIS — Z79.899 DRUG THERAPY: ICD-10-CM

## 2025-03-05 DIAGNOSIS — I50.9 CONGESTIVE HEART FAILURE, UNSPECIFIED HF CHRONICITY, UNSPECIFIED HEART FAILURE TYPE: ICD-10-CM

## 2025-03-05 DIAGNOSIS — I10 PRIMARY HYPERTENSION: ICD-10-CM

## 2025-03-05 DIAGNOSIS — E03.9 ACQUIRED HYPOTHYROIDISM: ICD-10-CM

## 2025-03-05 DIAGNOSIS — Z00.00 MEDICARE ANNUAL WELLNESS VISIT, SUBSEQUENT: ICD-10-CM

## 2025-03-05 DIAGNOSIS — E44.1 MILD PROTEIN-CALORIE MALNUTRITION (MULTI): ICD-10-CM

## 2025-03-05 DIAGNOSIS — M10.9 GOUT OF LEFT ANKLE, UNSPECIFIED CAUSE, UNSPECIFIED CHRONICITY: ICD-10-CM

## 2025-03-05 DIAGNOSIS — I48.0 PAROXYSMAL ATRIAL FIBRILLATION (MULTI): Primary | ICD-10-CM

## 2025-03-05 PROCEDURE — 1157F ADVNC CARE PLAN IN RCRD: CPT | Performed by: FAMILY MEDICINE

## 2025-03-05 PROCEDURE — G0446 INTENS BEHAVE THER CARDIO DX: HCPCS | Performed by: FAMILY MEDICINE

## 2025-03-05 PROCEDURE — 99214 OFFICE O/P EST MOD 30 MIN: CPT | Performed by: FAMILY MEDICINE

## 2025-03-05 PROCEDURE — G0439 PPPS, SUBSEQ VISIT: HCPCS | Performed by: FAMILY MEDICINE

## 2025-03-05 PROCEDURE — 3079F DIAST BP 80-89 MM HG: CPT | Performed by: FAMILY MEDICINE

## 2025-03-05 PROCEDURE — 1036F TOBACCO NON-USER: CPT | Performed by: FAMILY MEDICINE

## 2025-03-05 PROCEDURE — 69209 REMOVE IMPACTED EAR WAX UNI: CPT | Performed by: FAMILY MEDICINE

## 2025-03-05 PROCEDURE — 90677 PCV20 VACCINE IM: CPT | Performed by: FAMILY MEDICINE

## 2025-03-05 PROCEDURE — G0009 ADMIN PNEUMOCOCCAL VACCINE: HCPCS | Performed by: FAMILY MEDICINE

## 2025-03-05 PROCEDURE — 1124F ACP DISCUSS-NO DSCNMKR DOCD: CPT | Performed by: FAMILY MEDICINE

## 2025-03-05 PROCEDURE — 99397 PER PM REEVAL EST PAT 65+ YR: CPT | Performed by: FAMILY MEDICINE

## 2025-03-05 PROCEDURE — 1160F RVW MEDS BY RX/DR IN RCRD: CPT | Performed by: FAMILY MEDICINE

## 2025-03-05 PROCEDURE — 1159F MED LIST DOCD IN RCRD: CPT | Performed by: FAMILY MEDICINE

## 2025-03-05 PROCEDURE — 1170F FXNL STATUS ASSESSED: CPT | Performed by: FAMILY MEDICINE

## 2025-03-05 PROCEDURE — 3077F SYST BP >= 140 MM HG: CPT | Performed by: FAMILY MEDICINE

## 2025-03-05 PROCEDURE — 1126F AMNT PAIN NOTED NONE PRSNT: CPT | Performed by: FAMILY MEDICINE

## 2025-03-05 ASSESSMENT — ENCOUNTER SYMPTOMS
EYE REDNESS: 0
POLYDIPSIA: 0
HEADACHES: 0
SHORTNESS OF BREATH: 0
DYSURIA: 0
BACK PAIN: 0
WEAKNESS: 0
HEMATURIA: 0
ADENOPATHY: 0
COUGH: 0
FEVER: 0
PALPITATIONS: 0
EYE PAIN: 0
HALLUCINATIONS: 0
ABDOMINAL PAIN: 0
BLOOD IN STOOL: 0
WOUND: 0
RHINORRHEA: 0
BRUISES/BLEEDS EASILY: 0
SORE THROAT: 0
NECK STIFFNESS: 0
FATIGUE: 0
CHILLS: 0

## 2025-03-05 ASSESSMENT — ACTIVITIES OF DAILY LIVING (ADL)
MANAGING_FINANCES: INDEPENDENT
GROCERY_SHOPPING: INDEPENDENT
TAKING_MEDICATION: INDEPENDENT
DOING_HOUSEWORK: INDEPENDENT
DRESSING: INDEPENDENT
BATHING: INDEPENDENT

## 2025-03-05 ASSESSMENT — PATIENT HEALTH QUESTIONNAIRE - PHQ9
2. FEELING DOWN, DEPRESSED OR HOPELESS: NOT AT ALL
1. LITTLE INTEREST OR PLEASURE IN DOING THINGS: NOT AT ALL
SUM OF ALL RESPONSES TO PHQ9 QUESTIONS 1 AND 2: 0

## 2025-03-05 ASSESSMENT — PAIN SCALES - GENERAL: PAINLEVEL_OUTOF10: 0-NO PAIN

## 2025-03-05 NOTE — ASSESSMENT & PLAN NOTE
stable on current 40 mg lasix twice daily. Weight back down to near Dry weight 158.      Euvolemic on exam 3/5/25. F/U Dr Jane.

## 2025-03-05 NOTE — ASSESSMENT & PLAN NOTE
3/5/25 [facility] ANNUAL PREVENTATIVE EXAM -- check labs. encourage healthy diet and exercise. PLans to go to pool with dtr over the summer. HEP at home. 0/10 pain in right flank/low back.  Occasional left knee pain.     3/5/25  Medicare Wellness subsequent eval - no depression, no falls, no polypharmacy, no alcohol, no smoking. Declines flu vaccines.     Agrees to Prevnar 20 -- given 3/5/25.  Colonoscopy 2016 --no further testing needed. Declines mammograms. Mild urinary incontinence. Mild vascular dementia -- lives with daughter Charito Chu in suite above the garage. Care team includes myself (PCP), Dr Jane (CARDIO), Dr Renteria (NEphro).     3/5/25 I spent 15 minutes face-to-face with this individual discussing their cardiovascular risk and behavioral therapies of nutritional choices, exercise, and elimination of habits contributing to risk. We agreed on a plan addressing reduction of their current cardiovascular risk. For patients with risk calculated >10%, aspirin use was discussed and encouraged unless known allergy or increased risk of bleeding contraindicates use. Patient's 10 year CV risk estimate calculates to: >10%      # Left neck pain due to Trapezius and SCM spasm -- stretching exercises provided, recommend topical ice pack for sharp pain, but mainly to use heat pack to help muscles relax.     # AK on b/l hands -- single large 5 mm AK treated with verucca freeze -- wound care instructions provided verbally to inessa Mcneill. 5/24/22.     # Insomnia -- stopped using ambien -- no longer working and doesn't need.     # Ataxia -- encourage use of walker instead of just cane. TOlerated cut in amiodarone to 100 mg daily.   # Hx COVID pna -- hospitalized 1/31-2/9/22. Back to baseline.  # hx Pna and hilar mass 6/2021 -- resolved. Path benign. Follow up ct scans with Dr Brennan.  # hx Fall with wrist fx 4/2021 -- healed -- out of wrist splint. follows with Dr Jarrett.   # Onychomycosis -- treat with  terbinafine daily x 90 days.  # hx Abdominal cramping and loss of appetite due to nausea -- improved off magnesium and calcium supplements.   # Constipation -- continue daily miralax. Avoid bananas.   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  # Hx Diverticulitis -- resolved having completed course of bactrim/flagyl.  # Biliary dyskinesia -- resolved s/p jose 12/2019.  # hx pre-defecation syncopal episodes -- tolerating isosorbide ER cut from 60 bid to just 60 mg once at bedtime 12/2019.     # Hx of nasal polyps and allergic rhinitis --start fluticasone nasal spray (flonase)     # Seborrheic keratosis -- left neck -- reassurance given.      # Actinic keratoses -- left hand -- reassurance given. Will follow questionable lesion on right hand. Can excise if changes.      # Right shoulder OA --s/p successful TSA 11/2018. moderate cardiac risk for moderate risk orthopedic surgery.        # ANGIOEDEMA -- controlled on zyrtec.  # Right foot drop -- encouraged use of cane in her left hand. If having more trouble can get AFO or foot lift device.  # Hx of Left thalamic CVA -- symptoms returning to baseline. Mild dysgraphism persists. Due to pAFIB. Failed plavix due to gi bleed.  # GI bleed -- due to colonic avm and diverticulosis. Ok to eat nuts in moderation. EGD normal except GERD.     #Asthma -- stable on symbicort or advair.

## 2025-03-05 NOTE — PROGRESS NOTES
Patient ID: Renu Zimmerman is a 89 y.o. female.    Procedures pt came in and had a ear lavage done in her right ear. Patient tolerated well no complications.

## 2025-03-05 NOTE — ASSESSMENT & PLAN NOTE
PPM (4/2017).   On amiodarone to prevent recurrent episodes that cause blood clots. Off Aspirin 81 mg -- EGD clear of ulcers per Dr Donnelly but has GERD. On Eliquis due to PE 2/2020.

## 2025-03-05 NOTE — ASSESSMENT & PLAN NOTE
Weight stabilized over past 6 months -- continues 2 meals a day plus 110 calorie protein shake at lunch.     Feeling better and sleeping better at current weight.

## 2025-03-05 NOTE — ASSESSMENT & PLAN NOTE
Permissive HTN on isosorbide, bystolic and clonidine 0.1 tid (tolerating increase from hospitalization 5/2018). OFF ace due to angioedema. Off BB due to orthostatic hypotension 2/2017. F/U with Dr Jane.

## 2025-03-05 NOTE — ASSESSMENT & PLAN NOTE
last flare summer 2024 -- uric acid down to 5.8 (2022) with allopurinol 100 mg daily.     OK to try stopping allopurinol.  Discussed 6/11/24

## 2025-03-05 NOTE — ASSESSMENT & PLAN NOTE
Previously Compliant use of CPAP nightly for 8+ hours/night.   Continue current settings.   Had some complaint of air leaking around mask with new CPAP machine, but this improved with new face-mask.   No daytime somnolence.     Stopped using CPAP machine but breathing better with 12# weight loss.

## 2025-03-05 NOTE — PATIENT INSTRUCTIONS
OK to hold ELIQUIS x 3 days prior to planned dental work.  Restart the next day as long as no bleeding.

## 2025-03-05 NOTE — PROGRESS NOTES
Subjective   Reason for Visit: Renu Zimmerman is an 89 y.o. female here for a Medicare Wellness visit.  [place] preventative exam and annual recheck on CHF, and PAFIB.    Thyroid nodule benign per US.    Right ear wax interfering with her hearing aid.     Past Medical, Surgical, and Family History reviewed and updated in chart.    Reviewed all medications by prescribing practitioner or clinical pharmacist (such as prescriptions, OTCs, herbal therapies and supplements) and documented in the medical record.        Patient Care Team:  Yonathan Arnold MD as PCP - General  Yonathan Arnold MD as PCP - [employer] PCP  Yonathan Arnold MD as Primary Care Provider     Review of Systems   Constitutional:  Negative for chills, fatigue and fever.   HENT:  Negative for rhinorrhea and sore throat.    Eyes:  Negative for pain and redness.   Respiratory:  Negative for cough and shortness of breath.    Cardiovascular:  Negative for chest pain and palpitations.   Gastrointestinal:  Negative for abdominal pain and blood in stool.   Endocrine: Negative for polydipsia and polyuria.   Genitourinary:  Negative for dysuria and hematuria.   Musculoskeletal:  Negative for back pain and neck stiffness.   Skin:  Negative for rash and wound.   Allergic/Immunologic: Negative for environmental allergies and food allergies.   Neurological:  Negative for weakness and headaches.   Hematological:  Negative for adenopathy. Does not bruise/bleed easily.   Psychiatric/Behavioral:  Negative for hallucinations and suicidal ideas.        Objective   Vitals:97% on room air  /82   Pulse 61   Temp 36.7 °C (98.1 °F)   Wt 71.7 kg (158 lb)   SpO2 94%   BMI 28.90 kg/m²       Physical Exam  Vitals reviewed.   Constitutional:       General: She is not in acute distress.     Appearance: She is not ill-appearing.   HENT:      Head: Normocephalic and atraumatic.      Right Ear: Tympanic membrane normal.      Left Ear: Tympanic  membrane normal.      Nose: No congestion or rhinorrhea.      Mouth/Throat:      Pharynx: No oropharyngeal exudate or posterior oropharyngeal erythema.   Eyes:      Extraocular Movements: Extraocular movements intact.      Conjunctiva/sclera: Conjunctivae normal.      Pupils: Pupils are equal, round, and reactive to light.   Neck:      Comments: Right thyroid fullness/nodule.  Cardiovascular:      Rate and Rhythm: Normal rate and regular rhythm.      Heart sounds: No murmur heard.     No friction rub. No gallop.      Comments: PPM  Pulmonary:      Effort: Pulmonary effort is normal.      Breath sounds: Normal breath sounds. No wheezing, rhonchi or rales.   Abdominal:      General: There is no distension.      Palpations: Abdomen is soft.      Tenderness: There is no abdominal tenderness. There is no guarding or rebound.   Musculoskeletal:         General: No swelling or deformity.      Cervical back: Normal range of motion and neck supple.      Right lower leg: No edema.      Left lower leg: No edema.   Skin:     Capillary Refill: Capillary refill takes less than 2 seconds.      Coloration: Skin is not jaundiced.      Findings: No rash.   Neurological:      General: No focal deficit present.      Mental Status: She is alert.      Motor: No weakness.   Psychiatric:         Mood and Affect: Mood normal.         Behavior: Behavior normal.         Assessment/Plan   Problem List Items Addressed This Visit       CHF (congestive heart failure)    Overview     PPM ([facility]) placed 4/11/2017;     1/7/25 ECHO ([facility]) EF 60-65%, LVH w DD, mod TR.    1/14/25 CARDIO ([facility]) Compensated HFpEF. pAFIB in SR. Eliquis.         Current Assessment & Plan     stable on current 40 mg lasix twice daily. Weight back down to near Dry weight 158.      Euvolemic on exam 3/5/25. F/U Dr Jane.         Gout of left ankle    Current Assessment & Plan     last flare summer 2024 -- uric acid down to 5.8 (2022) with allopurinol 100 mg daily.     OK  to try stopping allopurinol.  Discussed 6/11/24         Relevant Orders    Uric acid    Paroxysmal atrial fibrillation (Multi) - Primary    Overview     Eliquis 2.5 BID  CHADS = 4  Cardio ACC         Current Assessment & Plan     PPM (4/2017).   On amiodarone to prevent recurrent episodes that cause blood clots. Off Aspirin 81 mg -- EGD clear of ulcers per Dr Donnelly but has GERD. On Eliquis due to PE 2/2020.         Protein-calorie malnutrition (Multi)    Current Assessment & Plan     Weight stabilized over past 6 months -- continues 2 meals a day plus 110 calorie protein shake at lunch.     Feeling better and sleeping better at current weight.          Hypothyroidism    Current Assessment & Plan     Euthyroid since increased to thyroxine 75 mcg 3/28/17.    3/2024 Thyroid US: 3 mildly suspicious nodules max size 10 mm.  <5% cancer risk. Reassurance provided but can refer to Dr Sandra if she wants a second opinion.            Medicare annual wellness visit, subsequent    Overview     3/5/25 [facility] and Annual [place] preventive exam performed         Current Assessment & Plan     3/5/25 [facility] ANNUAL PREVENTATIVE EXAM -- check labs. encourage healthy diet and exercise. PLans to go to pool with dtr over the summer. HEP at home. 0/10 pain in right flank/low back.  Occasional left knee pain.     3/5/25  Medicare Wellness subsequent eval - no depression, no falls, no polypharmacy, no alcohol, no smoking. Declines flu vaccines.     Agrees to Prevnar 20 -- given 3/5/25.  Colonoscopy 2016 --no further testing needed. Declines mammograms. Mild urinary incontinence. Mild vascular dementia -- lives with daughter Charito Chu in suite above the garage. Care team includes myself (PCP), Dr Jane (CARDIO), Dr Renteria (NEphro).     3/5/25 I spent 15 minutes face-to-face with this individual discussing their cardiovascular risk and behavioral therapies of nutritional choices, exercise, and elimination of habits contributing to  risk. We agreed on a plan addressing reduction of their current cardiovascular risk. For patients with risk calculated >10%, aspirin use was discussed and encouraged unless known allergy or increased risk of bleeding contraindicates use. Patient's 10 year CV risk estimate calculates to: >10%      # Left neck pain due to Trapezius and SCM spasm -- stretching exercises provided, recommend topical ice pack for sharp pain, but mainly to use heat pack to help muscles relax.     # AK on b/l hands -- single large 5 mm AK treated with verucca freeze -- wound care instructions provided verbally to daughter Charito. 5/24/22.     # Insomnia -- stopped using ambien -- no longer working and doesn't need.     # Ataxia -- encourage use of walker instead of just cane. TOlerated cut in amiodarone to 100 mg daily.   # Hx COVID pna -- hospitalized 1/31-2/9/22. Back to baseline.  # hx Pna and hilar mass 6/2021 -- resolved. Path benign. Follow up ct scans with Dr Brennan.  # hx Fall with wrist fx 4/2021 -- healed -- out of wrist splint. follows with Dr Jarrett.   # Onychomycosis -- treat with terbinafine daily x 90 days.  # hx Abdominal cramping and loss of appetite due to nausea -- improved off magnesium and calcium supplements.   # Constipation -- continue daily miralax. Avoid bananas.   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  # Hx Diverticulitis -- resolved having completed course of bactrim/flagyl.  # Biliary dyskinesia -- resolved s/p jose 12/2019.  # hx pre-defecation syncopal episodes -- tolerating isosorbide ER cut from 60 bid to just 60 mg once at bedtime 12/2019.     # Hx of nasal polyps and allergic rhinitis --start fluticasone nasal spray (flonase)     # Seborrheic keratosis -- left neck -- reassurance given.      # Actinic keratoses -- left hand -- reassurance given. Will follow questionable lesion on right hand. Can excise if changes.      # Right shoulder OA --s/p successful TSA 11/2018. moderate  cardiac risk for moderate risk orthopedic surgery.        # ANGIOEDEMA -- controlled on zyrtec.  # Right foot drop -- encouraged use of cane in her left hand. If having more trouble can get AFO or foot lift device.  # Hx of Left thalamic CVA -- symptoms returning to baseline. Mild dysgraphism persists. Due to pAFIB. Failed plavix due to gi bleed.  # GI bleed -- due to colonic avm and diverticulosis. Ok to eat nuts in moderation. EGD normal except GERD.     #Asthma -- stable on symbicort or advair.             HTN (hypertension)    Current Assessment & Plan     Permissive HTN on isosorbide, bystolic and clonidine 0.1 tid (tolerating increase from hospitalization 5/2018). OFF ace due to angioedema. Off BB due to orthostatic hypotension 2/2017. F/U with Dr Jane.          Relevant Orders    Comprehensive metabolic panel    CBC    Lipid panel    Tsh With Reflex To Free T4 If Abnormal     Other Visit Diagnoses       Drug therapy        Relevant Orders    Magnesium    Need for vaccination        Relevant Orders    Pneumococcal conjugate vaccine, 20-valent (PREVNAR 20) (Completed)    Excessive ear wax, right        removed with irrigation 3/5/25

## 2025-03-06 LAB
ALBUMIN SERPL-MCNC: 4.4 G/DL (ref 3.6–5.1)
ALP SERPL-CCNC: 67 U/L (ref 37–153)
ALT SERPL-CCNC: 10 U/L (ref 6–29)
ANION GAP SERPL CALCULATED.4IONS-SCNC: 10 MMOL/L (CALC) (ref 7–17)
AST SERPL-CCNC: 18 U/L (ref 10–35)
BILIRUB SERPL-MCNC: 0.8 MG/DL (ref 0.2–1.2)
BUN SERPL-MCNC: 23 MG/DL (ref 7–25)
CALCIUM SERPL-MCNC: 9.9 MG/DL (ref 8.6–10.4)
CHLORIDE SERPL-SCNC: 101 MMOL/L (ref 98–110)
CHOLEST SERPL-MCNC: 171 MG/DL
CHOLEST/HDLC SERPL: 3.2 (CALC)
CO2 SERPL-SCNC: 31 MMOL/L (ref 20–32)
CREAT SERPL-MCNC: 1.56 MG/DL (ref 0.6–0.95)
EGFRCR SERPLBLD CKD-EPI 2021: 31 ML/MIN/1.73M2
ERYTHROCYTE [DISTWIDTH] IN BLOOD BY AUTOMATED COUNT: 12.6 % (ref 11–15)
GLUCOSE SERPL-MCNC: 107 MG/DL (ref 65–99)
HCT VFR BLD AUTO: 43.7 % (ref 35–45)
HDLC SERPL-MCNC: 53 MG/DL
HGB BLD-MCNC: 14.6 G/DL (ref 11.7–15.5)
LDLC SERPL CALC-MCNC: 92 MG/DL (CALC)
MAGNESIUM SERPL-MCNC: 2.2 MG/DL (ref 1.5–2.5)
MCH RBC QN AUTO: 32.7 PG (ref 27–33)
MCHC RBC AUTO-ENTMCNC: 33.4 G/DL (ref 32–36)
MCV RBC AUTO: 98 FL (ref 80–100)
NONHDLC SERPL-MCNC: 118 MG/DL (CALC)
PLATELET # BLD AUTO: 395 THOUSAND/UL (ref 140–400)
PMV BLD REES-ECKER: 10.4 FL (ref 7.5–12.5)
POTASSIUM SERPL-SCNC: 4 MMOL/L (ref 3.5–5.3)
PROT SERPL-MCNC: 6.7 G/DL (ref 6.1–8.1)
RBC # BLD AUTO: 4.46 MILLION/UL (ref 3.8–5.1)
SODIUM SERPL-SCNC: 142 MMOL/L (ref 135–146)
TRIGL SERPL-MCNC: 166 MG/DL
TSH SERPL-ACNC: 1.26 MIU/L (ref 0.4–4.5)
URATE SERPL-MCNC: 7.3 MG/DL (ref 2.5–7)
WBC # BLD AUTO: 8.3 THOUSAND/UL (ref 3.8–10.8)

## 2025-03-14 DIAGNOSIS — K21.00 GASTROESOPHAGEAL REFLUX DISEASE WITH ESOPHAGITIS WITHOUT HEMORRHAGE: ICD-10-CM

## 2025-03-14 DIAGNOSIS — I25.10 CAD (CORONARY ARTERY DISEASE): Primary | ICD-10-CM

## 2025-03-14 RX ORDER — PANTOPRAZOLE SODIUM 40 MG/1
TABLET, DELAYED RELEASE ORAL
Qty: 180 TABLET | Refills: 3 | Status: SHIPPED | OUTPATIENT
Start: 2025-03-14

## 2025-03-16 RX ORDER — ISOSORBIDE MONONITRATE 60 MG/1
120 TABLET, EXTENDED RELEASE ORAL DAILY
Qty: 180 TABLET | Refills: 3 | Status: SHIPPED | OUTPATIENT
Start: 2025-03-16 | End: 2026-03-16

## 2026-03-06 ENCOUNTER — APPOINTMENT (OUTPATIENT)
Dept: PRIMARY CARE | Facility: CLINIC | Age: OVER 89
End: 2026-03-06
Payer: MEDICARE